# Patient Record
Sex: MALE | Race: WHITE | Employment: UNEMPLOYED | URBAN - NONMETROPOLITAN AREA
[De-identification: names, ages, dates, MRNs, and addresses within clinical notes are randomized per-mention and may not be internally consistent; named-entity substitution may affect disease eponyms.]

---

## 2018-01-22 ENCOUNTER — HOSPITAL ENCOUNTER (INPATIENT)
Age: 53
LOS: 1 days | Discharge: HOME | DRG: 885 | End: 2018-01-23
Attending: PSYCHIATRY & NEUROLOGY | Admitting: PSYCHIATRY & NEUROLOGY

## 2018-01-22 PROBLEM — F23 ACUTE PSYCHOSIS (HCC): Status: ACTIVE | Noted: 2018-01-22

## 2018-01-22 PROCEDURE — 1240000000 HC EMOTIONAL WELLNESS R&B

## 2018-01-22 PROCEDURE — 6370000000 HC RX 637 (ALT 250 FOR IP): Performed by: NURSE PRACTITIONER

## 2018-01-22 PROCEDURE — 6370000000 HC RX 637 (ALT 250 FOR IP): Performed by: PSYCHIATRY & NEUROLOGY

## 2018-01-22 PROCEDURE — 90792 PSYCH DIAG EVAL W/MED SRVCS: CPT | Performed by: NURSE PRACTITIONER

## 2018-01-22 RX ORDER — ACETAMINOPHEN 325 MG/1
650 TABLET ORAL EVERY 4 HOURS PRN
Status: DISCONTINUED | OUTPATIENT
Start: 2018-01-22 | End: 2018-01-23 | Stop reason: HOSPADM

## 2018-01-22 RX ORDER — TRAZODONE HYDROCHLORIDE 50 MG/1
50 TABLET ORAL NIGHTLY PRN
Status: DISCONTINUED | OUTPATIENT
Start: 2018-01-22 | End: 2018-01-23 | Stop reason: HOSPADM

## 2018-01-22 RX ORDER — HYDROXYZINE PAMOATE 25 MG/1
25 CAPSULE ORAL 3 TIMES DAILY PRN
Status: DISCONTINUED | OUTPATIENT
Start: 2018-01-22 | End: 2018-01-23 | Stop reason: HOSPADM

## 2018-01-22 RX ORDER — CITALOPRAM 20 MG/1
10 TABLET ORAL DAILY
Status: DISCONTINUED | OUTPATIENT
Start: 2018-01-22 | End: 2018-01-23 | Stop reason: HOSPADM

## 2018-01-22 RX ORDER — QUETIAPINE FUMARATE 25 MG/1
50 TABLET, FILM COATED ORAL NIGHTLY
Status: DISCONTINUED | OUTPATIENT
Start: 2018-01-22 | End: 2018-01-23 | Stop reason: HOSPADM

## 2018-01-22 RX ORDER — MAGNESIUM HYDROXIDE/ALUMINUM HYDROXICE/SIMETHICONE 120; 1200; 1200 MG/30ML; MG/30ML; MG/30ML
30 SUSPENSION ORAL PRN
Status: DISCONTINUED | OUTPATIENT
Start: 2018-01-22 | End: 2018-01-23 | Stop reason: HOSPADM

## 2018-01-22 RX ORDER — QUETIAPINE FUMARATE 25 MG/1
50 TABLET, FILM COATED ORAL 2 TIMES DAILY
Status: DISCONTINUED | OUTPATIENT
Start: 2018-01-22 | End: 2018-01-23 | Stop reason: HOSPADM

## 2018-01-22 RX ORDER — ALBUTEROL SULFATE 90 UG/1
1 AEROSOL, METERED RESPIRATORY (INHALATION) DAILY PRN
Status: DISCONTINUED | OUTPATIENT
Start: 2018-01-22 | End: 2018-01-23 | Stop reason: HOSPADM

## 2018-01-22 RX ORDER — OXCARBAZEPINE 300 MG/1
300 TABLET, FILM COATED ORAL 2 TIMES DAILY
Status: DISCONTINUED | OUTPATIENT
Start: 2018-01-22 | End: 2018-01-23 | Stop reason: HOSPADM

## 2018-01-22 RX ADMIN — QUETIAPINE FUMARATE 50 MG: 25 TABLET ORAL at 17:41

## 2018-01-22 RX ADMIN — CITALOPRAM 10 MG: 20 TABLET, FILM COATED ORAL at 17:41

## 2018-01-22 RX ADMIN — Medication 1 PUFF: at 11:51

## 2018-01-22 ASSESSMENT — LIFESTYLE VARIABLES: HISTORY_ALCOHOL_USE: NO

## 2018-01-22 ASSESSMENT — SLEEP AND FATIGUE QUESTIONNAIRES
DO YOU USE A SLEEP AID: NO
DIFFICULTY ARISING: NO
DIFFICULTY STAYING ASLEEP: YES
DO YOU HAVE DIFFICULTY SLEEPING: YES
AVERAGE NUMBER OF SLEEP HOURS: 5
DIFFICULTY FALLING ASLEEP: NO
RESTFUL SLEEP: NO
SLEEP PATTERN: DISTURBED/INTERRUPTED SLEEP

## 2018-01-22 ASSESSMENT — PATIENT HEALTH QUESTIONNAIRE - PHQ9: SUM OF ALL RESPONSES TO PHQ QUESTIONS 1-9: 8

## 2018-01-22 NOTE — BH NOTE
`[facility] [place]  Admission Note     Admission Type:   Admission Type:  Involuntary    Reason for admission:  Reason for Admission: Told mother if things diden't change he would kill himself    PATIENT STRENGTHS:  Strengths: Communication    Patient Strengths and Limitations:  Limitations: Difficult relationships / poor social skills    Addictive Behavior:   Addictive Behavior  In the past 3 months, have you felt or has someone told you that you have a problem with:  : None  Do you have a history of Chemical Use?: No  Do you have a history of Alcohol Use?: No  Do you have a history of Street Drug Abuse?: No  Histroy of Prescripton Drug Abuse?: No    Medical Problems:   Past Medical History:   Diagnosis Date    Asthma     History of blood transfusion     Psychiatric problem        Status EXAM:  Status and Exam  Normal: No  Facial Expression: Brightened, Flat  Affect: Blunt  Level of Consciousness: Alert  Mood:Normal: No  Mood: Anxious, Depressed, Sad  Motor Activity:Normal: Yes  Interview Behavior: Cooperative  Preception: [place] to Person, [place] Gang to Time, [place] to Place, [place] to Situation  Attention:Normal: Yes  Thought Processes: Circumstantial  Thought Content:Normal: No  Thought Content: Paranoia  Hallucinations: None  Delusions: No  Memory:Normal: No  Memory: Poor Recent, Poor Remote  Insight and Judgment: No  Insight and Judgment: Poor Judgment, Poor Insight  Present Suicidal Ideation: No  Present Homicidal Ideation: No    Tobacco Screening:  Practical Counseling, on admission, kianna X, if applicable and completed (first 3 are required if patient doesn't refuse):            ( )  Recognizing danger situations (included triggers and roadblocks)                    ( )  Coping skills (new ways to manage stress, exercise, relaxation techniques, changing routine, distraction)                                                           ( )  Basic information about quitting (benefits of quitting,
noted   MOBILITY:  Ambulates independently    GOALS:   Increase socialization by attending at least 3 groups on the unit daily.
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
## 2018-01-22 NOTE — PROGRESS NOTES
BHI Biopsychosocial Assessment    Current Level of Psychosocial Functioning     Independent    XXX  Dependent    Minimal Assist       Comments:      Psychosocial High Risk Factors (check all that apply)    Unable to obtain meds   Chronic illness/pain    Substance abuse     Lack of Family Support  XXX  Financial stress   XXX  Isolation   Inadequate Community Resources  Suicide attempt(s)  Not taking medications    Victim of crime   Developmental Delay  Unable to manage personal needs    Age 72 or older   Homeless  No transportation   Readmission within 30 days  Unemployment  Traumatic Event  Relational  Divorce XXX    Comments:   Sexual Orientation:  Heterosexual    Patient Strengths: Employed, housing, self reliant, works as a farmer, supportive friends    Patient Barriers: Financial related to not being able to be approved for a loan to purchase the agreed upon [address] in the divorce. Plan of Care     medication management, group/individual therapies, family meetings, psycho -education, treatment team meetings to assist with stabilization    Initial Discharge Plan: [place]      Clinical Summary:  Haven Hinson is a 47 y.o.  self-employed [address] male who presented to the unit from [facility] for making suicidal statement. Frankie Baca has recently experienced a divorce and he is in process of making arrangements to secure a loan to purchase 100 acres from his spouse as agreed. 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 Pt denies suicidal ideation. He denies a history of past depression or treatment. Denies substance abuse.

## 2018-01-23 VITALS
DIASTOLIC BLOOD PRESSURE: 73 MMHG | HEART RATE: 75 BPM | HEIGHT: 69 IN | SYSTOLIC BLOOD PRESSURE: 135 MMHG | OXYGEN SATURATION: 93 % | BODY MASS INDEX: 36.58 KG/M2 | RESPIRATION RATE: 16 BRPM | TEMPERATURE: 97 F | WEIGHT: 247 LBS

## 2018-01-23 PROBLEM — F23 ACUTE PSYCHOSIS (HCC): Status: RESOLVED | Noted: 2018-01-22 | Resolved: 2018-01-23

## 2018-01-23 PROCEDURE — 5130000000 HC BRIDGE APPOINTMENT

## 2018-01-23 PROCEDURE — 99239 HOSP IP/OBS DSCHRG MGMT >30: CPT | Performed by: PSYCHIATRY & NEUROLOGY

## 2018-01-23 ASSESSMENT — PAIN SCALES - GENERAL: PAINLEVEL_OUTOF10: 0

## 2018-01-23 NOTE — DISCHARGE SUMMARY
and Attending physician. Discharge Medications: There are no discharge medications for this patient. Discharge Exam:  Level of consciousness:  Within normal limits  Appearance: Street clothes, seated, with good grooming  Behavior/Motor: No abnormalities noted  Attitude toward examiner:  Cooperative, attentive, good eye contact  Speech:  spontaneous, normal rate, normal volume and well articulated  Mood:  I feel okay   Affect:  mood congruent  Thought processes:  linear, goal directed and coherent  Thought content:  Homocidal ideation denies  Suicidal Ideation:  denies suicidal ideation  Delusions:  no evidence of delusions  Perceptual Disturbance:  denies any perceptual disturbance  Cognition:  In tact  Memory: age appropriate  Insight & Judgement: fair  Medication side effects:  denies     Disposition: Home     Follow-up as scheduled with none, patient not interested     Time Spent: 32 minutes    Engagement: Patient displayed a fair level of engagement with the treatments offered during this admission.      Signed:  Electronically signed by Abelardo Caicedo MD on 1/23/2018 at 11:23 AM

## 2018-01-23 NOTE — PLAN OF CARE
Problem: KNOWLEDGE DEFICIT,EDUCATION,DISCHARGE PLAN  Goal: Knowledge - personal safety  Outcome: Completed Date Met: 01/23/18  Patient refused to complete safety plan

## 2018-01-23 NOTE — PLAN OF CARE
Problem: Depressive Behavior with or without Suicide precautions  Goal: LTG-Able to verbalize and/or display a decrease in depressive symptoms  Outcome: Ongoing  Patient denies depression, however has a flat affect and appears sad. Goal: STG-Able to verbalize suicidal ideations  Outcome: Ongoing  Patient denies. Goal: STG-Able to verbalize support system  Outcome: Completed Date Met: 01/23/18  Patient voices a support system. Goal: STG-Absence of Self Harm  Outcome: Ongoing  Patient remains safe and free from harm. Safety, fall and suicide precautions in place. Goal: STG-Knowledge of positive coping patterns  Outcome: Ongoing  Patient does not voice positive coping skills. Problem: Altered Mood, Psychotic Behavior  Goal: LTG-Able to verbalize reality based thinking  Outcome: Ongoing  Patient observed \"whispering to someone. \" Patient disoriented to situation and paranoid. Problem: Discharge Planning:  Goal: Discharged to appropriate level of care  Discharged to appropriate level of care   Outcome: Ongoing  Discharge planning is in progress. Problem: Falls - Risk of:  Goal: Will remain free from falls  Will remain free from falls   Outcome: Ongoing  No falls noted. Call light within reach. Fall precautions in place. Problem: KNOWLEDGE DEFICIT,EDUCATION,DISCHARGE PLAN  Goal: Knowledge - personal safety  Outcome: Ongoing  Safety plan not completed this shift. Problem: Activity:  Goal: Sleeping patterns will improve  Sleeping patterns will improve   Outcome: Ongoing  Patient currently sleeping. Comments: Care plan reviewed with patient. Patient does not verbalize understanding of the plan of care and does not contribute to goal setting.

## 2018-01-23 NOTE — PLAN OF CARE
Problem: Depressive Behavior with or without Suicide precautions  Goal: LTG-Able to verbalize and/or display a decrease in depressive symptoms  Outcome: Ongoing  Pt does not verbalize decrease in depressive symptoms-pt is reluctant to take antidepressants after much education pt reports willingness to \"try\"  Goal: STG-Able to verbalize suicidal ideations  Outcome: Ongoing  Denies suicidal thoughts, hallucinations & delusions. Cooperative with meds & care. Visual checks made every 15 min. & prn. Safe, caring, secure & supportive environment provided. Mood & behavior assessed & documented. Goal: STG-Able to verbalize support system  Outcome: Ongoing  Pt reports having support with his 3 sons. He has recently been thru a divorce after 24 yrs   Goal: STG-Absence of Self Harm  Outcome: Ongoing  Absence of self-harm to patient noted this shift. Goal: STG-Knowledge of positive coping patterns  Outcome: Ongoing  Pt encouraged to attend groups to increase knowledge healthy coping skills    Problem: Altered Mood, Psychotic Behavior  Goal: LTG-Able to verbalize reality based thinking  Outcome: Ongoing  Pt is oriented to person, place and time. He is not oriented to situation and was reoriented to situation     Problem: Discharge Planning:  Goal: Discharged to appropriate level of care  Discharged to appropriate level of care   Outcome: Ongoing  Patient will be discharged to safe, appropriate level of care. Patient will work with interdisciplinary team towards meeting discharge needs. Problem: Falls - Risk of:  Goal: Will remain free from falls  Will remain free from falls   Outcome: Ongoing  Free of falls this shift. Fall protocol in place. Up with gripper socks on for safe ambulation. Problem: KNOWLEDGE DEFICIT,EDUCATION,DISCHARGE PLAN  Goal: Knowledge - personal safety  Outcome: Ongoing  Pt encouraged to complete personal safety plan before discharge.  Education provided on benefits of completing personal safety plan    Problem: Activity:  Goal: Sleeping patterns will improve  Sleeping patterns will improve   Outcome: Ongoing  Pt will be started on seroquel to assist with sleep    Comments: Care plan reviewed with patient.   Patient does verbalize understanding of the plan of care and does not contribute to goal setting